# Patient Record
Sex: FEMALE | Race: WHITE | NOT HISPANIC OR LATINO | URBAN - METROPOLITAN AREA
[De-identification: names, ages, dates, MRNs, and addresses within clinical notes are randomized per-mention and may not be internally consistent; named-entity substitution may affect disease eponyms.]

---

## 2020-12-14 ENCOUNTER — OFFICE VISIT (OUTPATIENT)
Dept: URBAN - METROPOLITAN AREA CLINIC 84 | Facility: CLINIC | Age: 60
End: 2020-12-14
Payer: MEDICARE

## 2020-12-14 ENCOUNTER — WEB ENCOUNTER (OUTPATIENT)
Dept: URBAN - METROPOLITAN AREA CLINIC 84 | Facility: CLINIC | Age: 60
End: 2020-12-14

## 2020-12-14 VITALS
TEMPERATURE: 97.8 F | DIASTOLIC BLOOD PRESSURE: 90 MMHG | BODY MASS INDEX: 18.95 KG/M2 | WEIGHT: 103 LBS | HEIGHT: 62 IN | RESPIRATION RATE: 14 BRPM | SYSTOLIC BLOOD PRESSURE: 160 MMHG | HEART RATE: 82 BPM

## 2020-12-14 DIAGNOSIS — R63.3 FEEDING DIFFICULTIES: ICD-10-CM

## 2020-12-14 PROCEDURE — 99203 OFFICE O/P NEW LOW 30 MIN: CPT | Performed by: INTERNAL MEDICINE

## 2020-12-14 PROCEDURE — G8418 CALC BMI BLW LOW PARAM F/U: HCPCS | Performed by: INTERNAL MEDICINE

## 2020-12-14 PROCEDURE — 3017F COLORECTAL CA SCREEN DOC REV: CPT | Performed by: INTERNAL MEDICINE

## 2020-12-14 PROCEDURE — G8482 FLU IMMUNIZE ORDER/ADMIN: HCPCS | Performed by: INTERNAL MEDICINE

## 2020-12-14 PROCEDURE — 1036F TOBACCO NON-USER: CPT | Performed by: INTERNAL MEDICINE

## 2020-12-14 PROCEDURE — G8427 DOCREV CUR MEDS BY ELIG CLIN: HCPCS | Performed by: INTERNAL MEDICINE

## 2020-12-14 RX ORDER — SODIUM CHLORIDE TAB 1 GM 1 G
AS DIRECTED TAB MISCELLANEOUS
Status: ACTIVE | COMMUNITY

## 2020-12-14 RX ORDER — OLANZAPINE 2.5 MG/1
1 TABLET TABLET, FILM COATED ORAL ONCE A DAY
Status: ACTIVE | COMMUNITY

## 2020-12-14 RX ORDER — AMLODIPINE BESYLATE 5 MG/1
1 TABLET TABLET ORAL ONCE A DAY
Status: ACTIVE | COMMUNITY

## 2020-12-14 RX ORDER — LISINOPRIL 20 MG/1
1 TABLET TABLET ORAL ONCE A DAY
Status: ACTIVE | COMMUNITY

## 2020-12-14 NOTE — HPI-TODAY'S VISIT:
Patient present for possible PEG tube prior to possible restart of chemo. She is s/p chemo and xrt for throat cancer which required PEG tube and removed 6-7 months after patient showed signs of tolerating oral intake and weight gain. Recent imaging showed possible lung lesion of concern. Thus, pt sent for possible PEG anticipating chemo. Pt at this time will like to defer PEG unless sxs occur if and when chemo for new lesion is initiated. The decision is being discussed by the oncology team per pt's daughter.

## 2021-08-28 ENCOUNTER — TELEPHONE ENCOUNTER (OUTPATIENT)
Dept: URBAN - METROPOLITAN AREA CLINIC 13 | Facility: CLINIC | Age: 61
End: 2021-08-28

## 2021-08-29 ENCOUNTER — TELEPHONE ENCOUNTER (OUTPATIENT)
Dept: URBAN - METROPOLITAN AREA CLINIC 13 | Facility: CLINIC | Age: 61
End: 2021-08-29

## 2022-09-26 ENCOUNTER — OFFICE VISIT (OUTPATIENT)
Dept: URBAN - METROPOLITAN AREA CLINIC 88 | Facility: CLINIC | Age: 62
End: 2022-09-26
Payer: MEDICARE

## 2022-09-26 ENCOUNTER — LAB OUTSIDE AN ENCOUNTER (OUTPATIENT)
Dept: URBAN - METROPOLITAN AREA CLINIC 88 | Facility: CLINIC | Age: 62
End: 2022-09-26

## 2022-09-26 VITALS
TEMPERATURE: 97.3 F | HEART RATE: 81 BPM | SYSTOLIC BLOOD PRESSURE: 133 MMHG | DIASTOLIC BLOOD PRESSURE: 74 MMHG | HEIGHT: 62 IN | BODY MASS INDEX: 17.23 KG/M2 | WEIGHT: 93.6 LBS

## 2022-09-26 DIAGNOSIS — K59.04 CHRONIC IDIOPATHIC CONSTIPATION: ICD-10-CM

## 2022-09-26 DIAGNOSIS — K21.9 GASTROESOPHAGEAL REFLUX DISEASE, UNSPECIFIED WHETHER ESOPHAGITIS PRESENT: ICD-10-CM

## 2022-09-26 DIAGNOSIS — Z12.11 COLON CANCER SCREENING: ICD-10-CM

## 2022-09-26 PROCEDURE — 99214 OFFICE O/P EST MOD 30 MIN: CPT | Performed by: REGISTERED NURSE

## 2022-09-26 RX ORDER — LISINOPRIL 20 MG/1
1 TABLET TABLET ORAL ONCE A DAY
Status: DISCONTINUED | COMMUNITY

## 2022-09-26 RX ORDER — SUCRALFATE 1 G/1
1 TABLET ON AN EMPTY STOMACH TABLET ORAL
Qty: 90 | Refills: 1 | OUTPATIENT
Start: 2022-09-26 | End: 2022-11-24

## 2022-09-26 RX ORDER — ATENOLOL 25 MG/1
1 TABLET TABLET ORAL ONCE A DAY
Status: ACTIVE | COMMUNITY

## 2022-09-26 RX ORDER — SODIUM CHLORIDE TAB 1 GM 1 G
AS DIRECTED TAB MISCELLANEOUS
Status: ACTIVE | COMMUNITY

## 2022-09-26 RX ORDER — AMLODIPINE BESYLATE 5 MG/1
1 TABLET TABLET ORAL ONCE A DAY
Status: ACTIVE | COMMUNITY

## 2022-09-26 RX ORDER — LEVOTHYROXINE SODIUM 50 UG/1
1 TABLET IN THE MORNING ON AN EMPTY STOMACH TABLET ORAL ONCE A DAY
Status: ACTIVE | COMMUNITY

## 2022-09-26 RX ORDER — PANTOPRAZOLE SODIUM 40 MG/1
1 TABLET TABLET, DELAYED RELEASE ORAL TWICE A DAY
Status: ACTIVE | COMMUNITY

## 2022-09-26 RX ORDER — OLANZAPINE 2.5 MG/1
1 TABLET TABLET, FILM COATED ORAL ONCE A DAY
Status: DISCONTINUED | COMMUNITY

## 2022-09-26 NOTE — HPI-TODAY'S VISIT:
Pt presents with c/o GERD. Symptoms include severe heartburn and epigastric burning. Symptoms have been present for 2 months. Past treatment has been pantoprazole with no improvement. Symptoms are triggered by eating or drinking anything. Symptoms are relieved by nothing in particular. She has a hx of throat cancer. Had a PEG temporarily during treatment. She denies any dysphagia. She also c/o constipation. Bowels move 1-2 times a week. She frequently uses over the counter laxatives. No rectal bleeding. She has never had a colonoscopy.

## 2022-09-27 PROBLEM — 235595009 GASTROESOPHAGEAL REFLUX DISEASE: Status: ACTIVE | Noted: 2022-09-26

## 2022-10-14 ENCOUNTER — OFFICE VISIT (OUTPATIENT)
Dept: URBAN - METROPOLITAN AREA SURGERY CENTER 24 | Facility: SURGERY CENTER | Age: 62
End: 2022-10-14
Payer: MEDICARE

## 2022-10-14 ENCOUNTER — CLAIMS CREATED FROM THE CLAIM WINDOW (OUTPATIENT)
Dept: URBAN - METROPOLITAN AREA CLINIC 4 | Facility: CLINIC | Age: 62
End: 2022-10-14
Payer: MEDICARE

## 2022-10-14 DIAGNOSIS — K31.89 ACQUIRED DEFORMITY OF DUODENUM: ICD-10-CM

## 2022-10-14 DIAGNOSIS — K31.89 OTHER DISEASES OF STOMACH AND DUODENUM: ICD-10-CM

## 2022-10-14 DIAGNOSIS — K22.70 BARRETT ESOPHAGUS: ICD-10-CM

## 2022-10-14 PROCEDURE — G8907 PT DOC NO EVENTS ON DISCHARG: HCPCS | Performed by: INTERNAL MEDICINE

## 2022-10-14 PROCEDURE — 88312 SPECIAL STAINS GROUP 1: CPT | Performed by: PATHOLOGY

## 2022-10-14 PROCEDURE — 88305 TISSUE EXAM BY PATHOLOGIST: CPT | Performed by: PATHOLOGY

## 2022-10-14 PROCEDURE — 43239 EGD BIOPSY SINGLE/MULTIPLE: CPT | Performed by: INTERNAL MEDICINE

## 2022-10-14 RX ORDER — AMLODIPINE BESYLATE 5 MG/1
1 TABLET TABLET ORAL ONCE A DAY
Status: ACTIVE | COMMUNITY

## 2022-10-14 RX ORDER — SODIUM CHLORIDE TAB 1 GM 1 G
AS DIRECTED TAB MISCELLANEOUS
Status: ACTIVE | COMMUNITY

## 2022-10-14 RX ORDER — LEVOTHYROXINE SODIUM 50 UG/1
1 TABLET IN THE MORNING ON AN EMPTY STOMACH TABLET ORAL ONCE A DAY
Status: ACTIVE | COMMUNITY

## 2022-10-14 RX ORDER — ATENOLOL 25 MG/1
1 TABLET TABLET ORAL ONCE A DAY
Status: ACTIVE | COMMUNITY

## 2022-10-14 RX ORDER — SUCRALFATE 1 G/1
1 TABLET ON AN EMPTY STOMACH TABLET ORAL
Qty: 90 | Refills: 1 | Status: ACTIVE | COMMUNITY
Start: 2022-09-26 | End: 2022-11-24

## 2022-10-14 RX ORDER — PANTOPRAZOLE SODIUM 40 MG/1
1 TABLET TABLET, DELAYED RELEASE ORAL TWICE A DAY
Status: ACTIVE | COMMUNITY

## 2022-10-18 ENCOUNTER — ERX REFILL RESPONSE (OUTPATIENT)
Dept: URBAN - METROPOLITAN AREA CLINIC 70 | Facility: CLINIC | Age: 62
End: 2022-10-18

## 2022-10-18 RX ORDER — SUCRALFATE 1 G/1
1 TABLET ON AN EMPTY STOMACH TABLET ORAL
Qty: 90 | Refills: 1 | OUTPATIENT

## 2022-10-18 RX ORDER — SUCRALFATE 1 G/1
TAKE 1 TABLET ON AN EMPTY STOMACH ORALLY 3 TIMES A DAY BEFORE MEALS 30 DAY(S) TABLET ORAL
Qty: 90 TABLET | Refills: 1 | OUTPATIENT

## 2022-11-08 ENCOUNTER — OFFICE VISIT (OUTPATIENT)
Dept: URBAN - METROPOLITAN AREA CLINIC 88 | Facility: CLINIC | Age: 62
End: 2022-11-08
Payer: MEDICARE

## 2022-11-08 VITALS
SYSTOLIC BLOOD PRESSURE: 104 MMHG | TEMPERATURE: 97.7 F | HEART RATE: 60 BPM | HEIGHT: 62 IN | DIASTOLIC BLOOD PRESSURE: 68 MMHG | WEIGHT: 93.2 LBS | BODY MASS INDEX: 17.15 KG/M2

## 2022-11-08 DIAGNOSIS — K59.04 CHRONIC IDIOPATHIC CONSTIPATION: ICD-10-CM

## 2022-11-08 DIAGNOSIS — K21.00 GASTROESOPHAGEAL REFLUX DISEASE WITH ESOPHAGITIS WITHOUT HEMORRHAGE: ICD-10-CM

## 2022-11-08 DIAGNOSIS — K22.70 BARRETT'S ESOPHAGUS WITHOUT DYSPLASIA: ICD-10-CM

## 2022-11-08 DIAGNOSIS — Z12.11 COLON CANCER SCREENING: ICD-10-CM

## 2022-11-08 PROBLEM — 82934008 CHRONIC IDIOPATHIC CONSTIPATION: Status: ACTIVE | Noted: 2022-09-26

## 2022-11-08 PROCEDURE — 99214 OFFICE O/P EST MOD 30 MIN: CPT | Performed by: REGISTERED NURSE

## 2022-11-08 RX ORDER — AMLODIPINE BESYLATE 5 MG/1
1 TABLET TABLET ORAL ONCE A DAY
Status: ACTIVE | COMMUNITY

## 2022-11-08 RX ORDER — LEVOTHYROXINE SODIUM 50 UG/1
1 TABLET IN THE MORNING ON AN EMPTY STOMACH TABLET ORAL ONCE A DAY
Status: ACTIVE | COMMUNITY

## 2022-11-08 RX ORDER — ATENOLOL 25 MG/1
1 TABLET TABLET ORAL ONCE A DAY
Status: ACTIVE | COMMUNITY

## 2022-11-08 RX ORDER — PANTOPRAZOLE SODIUM 40 MG/1
1 TABLET TABLET, DELAYED RELEASE ORAL TWICE A DAY
Status: ON HOLD | COMMUNITY

## 2022-11-08 RX ORDER — SODIUM CHLORIDE TAB 1 GM 1 G
AS DIRECTED TAB MISCELLANEOUS
Status: DISCONTINUED | COMMUNITY

## 2022-11-08 RX ORDER — SUCRALFATE 1 G/1
TAKE 1 TABLET ON AN EMPTY STOMACH ORALLY 3 TIMES A DAY BEFORE MEALS 30 DAY(S) TABLET ORAL
OUTPATIENT

## 2022-11-08 RX ORDER — SUCRALFATE 1 G/1
TAKE 1 TABLET ON AN EMPTY STOMACH ORALLY 3 TIMES A DAY BEFORE MEALS 30 DAY(S) TABLET ORAL
Qty: 90 TABLET | Refills: 1 | Status: ACTIVE | COMMUNITY

## 2022-11-08 RX ORDER — PANTOPRAZOLE SODIUM 40 MG/1
1 TABLET TABLET, DELAYED RELEASE ORAL TWICE A DAY
OUTPATIENT

## 2022-11-08 NOTE — HPI-OTHER HISTORIES
Note from OV 9/26/22: Pt presents with c/o GERD. Symptoms include severe heartburn and epigastric burning. Symptoms have been present for 2 months. Past treatment has been pantoprazole with no improvement. Symptoms are triggered by eating or drinking anything. Symptoms are relieved by nothing in particular. She has a hx of throat cancer. Had a PEG temporarily during treatment. She denies any dysphagia. She also c/o constipation. Bowels move 1-2 times a week. She frequently uses over the counter laxatives. No rectal bleeding. She has never had a colonoscopy. ------------------------------------

## 2022-11-08 NOTE — HPI-TODAY'S VISIT:
GERD symptoms have improved some but she stopped taking the pantoprazole when she started the sucralfate. Bowels are moving better with Mirlax but she is not taking it every day.    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

## 2022-11-15 ENCOUNTER — ERX REFILL RESPONSE (OUTPATIENT)
Dept: URBAN - METROPOLITAN AREA CLINIC 70 | Facility: CLINIC | Age: 62
End: 2022-11-15

## 2022-11-15 RX ORDER — SUCRALFATE 1 G/1
TAKE 1 TABLET ON AN EMPTY STOMACH ORALLY 3 TIMES A DAY BEFORE MEALS 30 DAY(S) TABLET ORAL
Qty: 90 TABLET | Refills: 1 | OUTPATIENT

## 2022-11-15 RX ORDER — SUCRALFATE 1 G/1
TAKE 1 TABLET ON AN EMPTY STOMACH ORALLY 3 TIMES A DAY BEFORE MEALS 30 DAY(S) TABLET ORAL
OUTPATIENT

## 2022-12-09 ENCOUNTER — ERX REFILL RESPONSE (OUTPATIENT)
Dept: URBAN - METROPOLITAN AREA CLINIC 70 | Facility: CLINIC | Age: 62
End: 2022-12-09

## 2022-12-09 RX ORDER — SUCRALFATE 1 G/1
TAKE 1 TABLET ON AN EMPTY STOMACH ORALLY 3 TIMES A DAY BEFORE MEALS 30 DAY(S) TABLET ORAL
Qty: 90 TABLET | Refills: 1 | OUTPATIENT

## 2022-12-21 ENCOUNTER — TELEPHONE ENCOUNTER (OUTPATIENT)
Dept: URBAN - METROPOLITAN AREA CLINIC 88 | Facility: CLINIC | Age: 62
End: 2022-12-21

## 2022-12-21 RX ORDER — PANTOPRAZOLE SODIUM 40 MG/1
1 TABLET TABLET, DELAYED RELEASE ORAL ONCE A DAY
Qty: 30 | Refills: 6

## 2023-01-04 ENCOUNTER — ERX REFILL RESPONSE (OUTPATIENT)
Dept: URBAN - METROPOLITAN AREA CLINIC 70 | Facility: CLINIC | Age: 63
End: 2023-01-04

## 2023-01-04 RX ORDER — SUCRALFATE 1 G/1
TAKE 1 TABLET ON AN EMPTY STOMACH ORALLY 3 TIMES A DAY BEFORE MEALS 30 DAY(S) TABLET ORAL
Qty: 90 TABLET | Refills: 1 | OUTPATIENT

## 2023-01-11 ENCOUNTER — OFFICE VISIT (OUTPATIENT)
Dept: URBAN - METROPOLITAN AREA CLINIC 88 | Facility: CLINIC | Age: 63
End: 2023-01-11
Payer: MEDICARE

## 2023-01-11 ENCOUNTER — LAB OUTSIDE AN ENCOUNTER (OUTPATIENT)
Dept: URBAN - METROPOLITAN AREA CLINIC 88 | Facility: CLINIC | Age: 63
End: 2023-01-11

## 2023-01-11 VITALS
HEART RATE: 65 BPM | HEIGHT: 62 IN | DIASTOLIC BLOOD PRESSURE: 83 MMHG | BODY MASS INDEX: 17.63 KG/M2 | WEIGHT: 95.8 LBS | TEMPERATURE: 97 F | SYSTOLIC BLOOD PRESSURE: 118 MMHG

## 2023-01-11 DIAGNOSIS — Z12.11 ENCOUNTER FOR SCREENING COLONOSCOPY: ICD-10-CM

## 2023-01-11 DIAGNOSIS — K22.70 BARRETT'S ESOPHAGUS WITHOUT DYSPLASIA: ICD-10-CM

## 2023-01-11 DIAGNOSIS — K59.09 CHANGE IN BOWEL MOVEMENTS INTERMITTENT CONSTIPATION. URGENCY IN THE MORNING.: ICD-10-CM

## 2023-01-11 DIAGNOSIS — R10.13 EPIGASTRIC ABDOMINAL PAIN: ICD-10-CM

## 2023-01-11 PROCEDURE — 99214 OFFICE O/P EST MOD 30 MIN: CPT | Performed by: NURSE PRACTITIONER

## 2023-01-11 RX ORDER — AMLODIPINE BESYLATE 5 MG/1
1 TABLET TABLET ORAL ONCE A DAY
Status: ACTIVE | COMMUNITY

## 2023-01-11 RX ORDER — ATENOLOL 25 MG/1
1 TABLET TABLET ORAL ONCE A DAY
Status: ACTIVE | COMMUNITY

## 2023-01-11 RX ORDER — PANTOPRAZOLE SODIUM 40 MG/1
1 TABLET TABLET, DELAYED RELEASE ORAL
Qty: 180 | Refills: 1

## 2023-01-11 RX ORDER — LEVOTHYROXINE SODIUM 50 UG/1
1 TABLET IN THE MORNING ON AN EMPTY STOMACH TABLET ORAL ONCE A DAY
Status: ACTIVE | COMMUNITY

## 2023-01-11 RX ORDER — PANTOPRAZOLE SODIUM 40 MG/1
1 TABLET TABLET, DELAYED RELEASE ORAL ONCE A DAY
Qty: 30 | Refills: 6 | Status: ACTIVE | COMMUNITY

## 2023-01-11 RX ORDER — SUCRALFATE 1 G/1
TAKE 1 TABLET ON AN EMPTY STOMACH ORALLY 3 TIMES A DAY BEFORE MEALS 30 DAY(S) TABLET ORAL
OUTPATIENT

## 2023-01-11 RX ORDER — SUCRALFATE 1 G/1
TAKE 1 TABLET ON AN EMPTY STOMACH ORALLY 3 TIMES A DAY BEFORE MEALS 30 DAY(S) TABLET ORAL
Qty: 90 TABLET | Refills: 1 | Status: ACTIVE | COMMUNITY

## 2023-01-11 NOTE — HPI-TODAY'S VISIT:
Patient presents today, along with daughter, for c/o persistent burning epigastric pain.  Was instructed to increase pantoprazole to 40 mg BID after [facility]OV.  Despite this change patient continued to experience this pain.  Pain is worse after eating and drinking in general.  Discomfort may radiate up into lower sternum at times.  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  Defecation does help to improve discomfort slightly.  Voices long hx of constipation that has increased over the years.  Without use of laxatives, defecation occurs every 4-5 days.  Currently takes OTC laxatives every other day to induce defecation.  With laxative use, defecation occurs every 2-3 days.  Still fails to achieve a complete sense of evacuation, which leads to increase bloating, flatulence and abdominal pain.  Feels her abdominal burning does improve slightly with defecation.  Has tried and failed stool softeners, magnesium tablets, fiber regimen and Miralax in past for constipation.  Denies prior colonoscopy.

## 2023-01-11 NOTE — PHYSICAL EXAM GASTROINTESTINAL
Abdomen , soft, RUQ and epigastric tenderness with involuntary guarding, nondistended , no rigidity , no masses palpable , normal bowel sounds , Liver and Spleen:  no hepatosplenomegaly

## 2023-01-19 ENCOUNTER — WEB ENCOUNTER (OUTPATIENT)
Dept: URBAN - METROPOLITAN AREA CLINIC 88 | Facility: CLINIC | Age: 63
End: 2023-01-19

## 2023-01-23 ENCOUNTER — LAB OUTSIDE AN ENCOUNTER (OUTPATIENT)
Dept: URBAN - METROPOLITAN AREA CLINIC 88 | Facility: CLINIC | Age: 63
End: 2023-01-23

## 2023-01-25 ENCOUNTER — TELEPHONE ENCOUNTER (OUTPATIENT)
Dept: URBAN - METROPOLITAN AREA CLINIC 88 | Facility: CLINIC | Age: 63
End: 2023-01-25

## 2023-01-25 RX ORDER — POLYETHYLENE GLYCOL 3350, SODIUM CHLORIDE, SODIUM BICARBONATE, POTASSIUM CHLORIDE 420; 11.2; 5.72; 1.48 G/4L; G/4L; G/4L; G/4L
MIX AS DIRECTED.  DRINK ONE CUP EVERY 15 MINUTES UNTIL COMPLETE FOR BOWEL CLEANSING POWDER, FOR SOLUTION ORAL ONCE
Qty: 1 | Refills: 0 | OUTPATIENT
Start: 2023-01-31 | End: 2023-02-01

## 2023-01-25 RX ORDER — LACTULOSE 10 G/15ML
TAKE 15 ML SOLUTION ORAL
Qty: 900 ML | Refills: 5
Start: 2023-01-30 | End: 2023-07-30

## 2023-01-30 ENCOUNTER — TELEPHONE ENCOUNTER (OUTPATIENT)
Dept: URBAN - METROPOLITAN AREA CLINIC 70 | Facility: CLINIC | Age: 63
End: 2023-01-30

## 2023-01-30 RX ORDER — LACTULOSE 10 G/15ML
TAKE 15 ML SOLUTION ORAL
Qty: 900 ML | Refills: 5 | OUTPATIENT
Start: 2023-01-30 | End: 2023-07-29

## 2023-02-02 ENCOUNTER — ERX REFILL RESPONSE (OUTPATIENT)
Dept: URBAN - METROPOLITAN AREA CLINIC 70 | Facility: CLINIC | Age: 63
End: 2023-02-02

## 2023-02-02 RX ORDER — SUCRALFATE 1 G/1
TAKE 1 TABLET ON AN EMPTY STOMACH ORALLY 3 TIMES A DAY BEFORE MEALS 30 DAY(S) TABLET ORAL
Qty: 90 TABLET | Refills: 1 | OUTPATIENT

## 2023-02-02 RX ORDER — SUCRALFATE 1 G/1
TAKE 1 TABLET ON AN EMPTY STOMACH ORALLY 3 TIMES A DAY BEFORE MEALS 30 DAY(S) TABLET ORAL
Qty: 90 TABLET | Refills: 2 | OUTPATIENT

## 2023-02-14 PROBLEM — 35298007: Status: ACTIVE | Noted: 2023-01-11

## 2023-02-15 ENCOUNTER — OFFICE VISIT (OUTPATIENT)
Dept: URBAN - METROPOLITAN AREA CLINIC 88 | Facility: CLINIC | Age: 63
End: 2023-02-15
Payer: MEDICARE

## 2023-02-15 VITALS
HEIGHT: 62 IN | HEART RATE: 64 BPM | WEIGHT: 94.2 LBS | SYSTOLIC BLOOD PRESSURE: 123 MMHG | TEMPERATURE: 97.9 F | DIASTOLIC BLOOD PRESSURE: 72 MMHG | BODY MASS INDEX: 17.34 KG/M2

## 2023-02-15 DIAGNOSIS — R10.13 EPIGASTRIC ABDOMINAL PAIN: ICD-10-CM

## 2023-02-15 DIAGNOSIS — Z12.11 ENCOUNTER FOR SCREENING COLONOSCOPY: ICD-10-CM

## 2023-02-15 DIAGNOSIS — K22.70 BARRETT'S ESOPHAGUS WITHOUT DYSPLASIA: ICD-10-CM

## 2023-02-15 DIAGNOSIS — K58.1 IRRITABLE BOWEL SYNDROME WITH CONSTIPATION: ICD-10-CM

## 2023-02-15 PROBLEM — 440630006: Status: ACTIVE | Noted: 2023-01-11

## 2023-02-15 PROBLEM — 302914006 BARRETT'S ESOPHAGUS: Status: ACTIVE | Noted: 2022-11-08

## 2023-02-15 PROCEDURE — 99214 OFFICE O/P EST MOD 30 MIN: CPT | Performed by: NURSE PRACTITIONER

## 2023-02-15 RX ORDER — ATENOLOL 25 MG/1
1 TABLET TABLET ORAL ONCE A DAY
Status: ACTIVE | COMMUNITY

## 2023-02-15 RX ORDER — LACTULOSE 10 G/15ML
TAKE 15 ML SOLUTION ORAL
Qty: 900 ML | Refills: 5 | Status: ACTIVE | COMMUNITY
Start: 2023-01-30 | End: 2023-07-29

## 2023-02-15 RX ORDER — LACTULOSE 10 G/15ML
TAKE 15 ML SOLUTION ORAL
OUTPATIENT
Start: 2023-01-30

## 2023-02-15 RX ORDER — PANTOPRAZOLE SODIUM 40 MG/1
1 TABLET TABLET, DELAYED RELEASE ORAL
Qty: 180 | Refills: 1 | Status: ACTIVE | COMMUNITY

## 2023-02-15 RX ORDER — SUCRALFATE 1 G/1
TAKE 1 TABLET ON AN EMPTY STOMACH ORALLY 3 TIMES A DAY BEFORE MEALS 30 DAY(S) TABLET ORAL
Qty: 90 TABLET | Refills: 1 | Status: ACTIVE | COMMUNITY

## 2023-02-15 RX ORDER — LEVOTHYROXINE SODIUM 50 UG/1
1 TABLET IN THE MORNING ON AN EMPTY STOMACH TABLET ORAL ONCE A DAY
Status: ACTIVE | COMMUNITY

## 2023-02-15 RX ORDER — AMLODIPINE BESYLATE 5 MG/1
1 TABLET TABLET ORAL ONCE A DAY
Status: ACTIVE | COMMUNITY

## 2023-02-15 RX ORDER — PANTOPRAZOLE SODIUM 40 MG/1
1 TABLET TABLET, DELAYED RELEASE ORAL
OUTPATIENT

## 2023-02-15 NOTE — HPI-TODAY'S VISIT:
Patient presents today for f/u regarding constipation and GERD.  KUB 01/24/2023:  Moderate volume of stool. RUQ US 01/17/2023:  Fatty liver, small gallstone w/o signs of cholecystitis.  Provided with Colyte bowel cleanse after KUB resulted.  Patient states her epigastric pain and bloating improved after this.  Started daily use of lactulose which leads to daily BMs.  Medication may cause gas/bloating but overall feels medication effective.  Has noticed less symptoms of reflux.  Did not feel samples of Linzes 290 mcg were as effective as Lactulose.   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  Denies prior colonoscopy.  Overall, voices no complaints at this time.

## 2023-02-15 NOTE — HPI-OTHER HISTORIES
---------------------------------------------------------------------------- Last office note 01/11/2023: Patient presents today, along with daughter, for c/o persistent burning epigastric pain.  Was instructed to increase pantoprazole to 40 mg BID after LOV.  Despite this change patient continued to experience this pain.  Pain is worse after eating and drinking in general.  Discomfort may radiate up into lower sternum at times.  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  Defecation does help to improve discomfort slightly.  Voices long hx of constipation that has increased over the years.  Without use of laxatives, defecation occurs every 4-5 days.  Currently takes OTC laxatives every other day to induce defecation.  With laxative use, defecation occurs every 2-3 days.  Still fails to achieve a complete sense of evacuation, which leads to increase bloating, flatulence and abdominal pain.  Feels her abdominal burning does improve slightly with defecation.  Has tried and failed stool softeners, magnesium tablets, fiber regimen and Miralax in past for constipation.  Denies prior colonoscopy.

## 2023-03-02 ENCOUNTER — ERX REFILL RESPONSE (OUTPATIENT)
Dept: URBAN - METROPOLITAN AREA CLINIC 70 | Facility: CLINIC | Age: 63
End: 2023-03-02

## 2023-03-02 RX ORDER — SUCRALFATE 1 G/1
TAKE 1 TABLET ON AN EMPTY STOMACH ORALLY 3 TIMES A DAY BEFORE MEALS 30 DAY(S) TABLET ORAL
Qty: 90 TABLET | Refills: 1 | OUTPATIENT

## 2023-03-17 ENCOUNTER — ERX REFILL RESPONSE (OUTPATIENT)
Dept: URBAN - METROPOLITAN AREA CLINIC 70 | Facility: CLINIC | Age: 63
End: 2023-03-17

## 2023-03-17 RX ORDER — SUCRALFATE 1 G/1
TAKE 1 TABLET ON AN EMPTY STOMACH ORALLY 3 TIMES A DAY BEFORE MEALS 30 DAY(S) TABLET ORAL
Qty: 90 TABLET | Refills: 1 | OUTPATIENT

## 2023-03-17 RX ORDER — SUCRALFATE 1 G/1
TAKE 1 TABLET ON AN EMPTY STOMACH ORALLY 3 TIMES A DAY BEFORE MEALS 30 DAY(S) TABLET ORAL
Qty: 270 TABLET | Refills: 1 | OUTPATIENT

## 2023-05-08 ENCOUNTER — TELEPHONE ENCOUNTER (OUTPATIENT)
Dept: URBAN - METROPOLITAN AREA CLINIC 88 | Facility: CLINIC | Age: 63
End: 2023-05-08

## 2023-05-08 RX ORDER — PANTOPRAZOLE SODIUM 40 MG/1
1 TABLET TABLET, DELAYED RELEASE ORAL
Qty: 180 | Refills: 1

## 2023-05-30 ENCOUNTER — LAB OUTSIDE AN ENCOUNTER (OUTPATIENT)
Dept: URBAN - METROPOLITAN AREA CLINIC 88 | Facility: CLINIC | Age: 63
End: 2023-05-30

## 2023-05-30 ENCOUNTER — OFFICE VISIT (OUTPATIENT)
Dept: URBAN - METROPOLITAN AREA CLINIC 88 | Facility: CLINIC | Age: 63
End: 2023-05-30
Payer: MEDICARE

## 2023-05-30 VITALS
WEIGHT: 93.4 LBS | TEMPERATURE: 98.1 F | HEART RATE: 79 BPM | BODY MASS INDEX: 17.19 KG/M2 | HEIGHT: 62 IN | DIASTOLIC BLOOD PRESSURE: 79 MMHG | SYSTOLIC BLOOD PRESSURE: 130 MMHG

## 2023-05-30 DIAGNOSIS — Z12.11 ENCOUNTER FOR SCREENING COLONOSCOPY: ICD-10-CM

## 2023-05-30 DIAGNOSIS — R07.89 OTHER CHEST PAIN: ICD-10-CM

## 2023-05-30 DIAGNOSIS — K22.70 BARRETT'S ESOPHAGUS WITHOUT DYSPLASIA: ICD-10-CM

## 2023-05-30 DIAGNOSIS — K58.1 IRRITABLE BOWEL SYNDROME WITH CONSTIPATION: ICD-10-CM

## 2023-05-30 DIAGNOSIS — R10.13 EPIGASTRIC ABDOMINAL PAIN: ICD-10-CM

## 2023-05-30 PROCEDURE — 99214 OFFICE O/P EST MOD 30 MIN: CPT | Performed by: NURSE PRACTITIONER

## 2023-05-30 RX ORDER — AMLODIPINE BESYLATE 5 MG/1
1 TABLET TABLET ORAL ONCE A DAY
Status: ACTIVE | COMMUNITY

## 2023-05-30 RX ORDER — LACTULOSE 10 G/15ML
TAKE 15 ML SOLUTION ORAL
OUTPATIENT

## 2023-05-30 RX ORDER — LEVOTHYROXINE SODIUM 50 UG/1
1 TABLET IN THE MORNING ON AN EMPTY STOMACH TABLET ORAL ONCE A DAY
Status: ACTIVE | COMMUNITY

## 2023-05-30 RX ORDER — SUCRALFATE 1 G/1
TAKE 1 TABLET ON AN EMPTY STOMACH ORALLY 3 TIMES A DAY BEFORE MEALS 30 DAY(S) TABLET ORAL
Qty: 270 TABLET | Refills: 1 | Status: ACTIVE | COMMUNITY

## 2023-05-30 RX ORDER — LACTULOSE 10 G/15ML
TAKE 15 ML SOLUTION ORAL
Status: ACTIVE | COMMUNITY
Start: 2023-01-30

## 2023-05-30 RX ORDER — PANTOPRAZOLE SODIUM 40 MG/1
1 TABLET TABLET, DELAYED RELEASE ORAL
Qty: 180 | Refills: 1 | Status: ACTIVE | COMMUNITY

## 2023-05-30 RX ORDER — ATENOLOL 25 MG/1
1 TABLET TABLET ORAL ONCE A DAY
Status: ACTIVE | COMMUNITY

## 2023-05-30 RX ORDER — PANTOPRAZOLE SODIUM 40 MG/1
1 TABLET TABLET, DELAYED RELEASE ORAL
OUTPATIENT

## 2023-05-30 NOTE — HPI-OTHER HISTORIES
------------------------------------------------------------------------------------- Last office note 02/15/2023: Patient presents today for f/u regarding constipation and GERD.  KUB 01/24/2023:  Moderate volume of stool. RUQ US 01/17/2023:  Fatty liver, small gallstone w/o signs of cholecystitis.  Provided with Colyte bowel cleanse after KUB resulted.  Patient states her epigastric pain and bloating improved after this.  Started daily use of lactulose which leads to daily BMs.  Medication may cause gas/bloating but overall feels medication effective.  Has noticed less symptoms of reflux.  Did not feel samples of Linzes 290 mcg were as effective as Lactulose.   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  Denies prior colonoscopy.  Overall, voices no complaints at this time.

## 2023-05-30 NOTE — PHYSICAL EXAM GASTROINTESTINAL
Abdomen , soft, RUQ and epigastric tenderness with involuntary guarding, nondistended , no guarding or rigidity , no masses palpable , normal bowel sounds , Liver and Spleen: no hepatosplenomegaly

## 2023-06-02 ENCOUNTER — OFFICE VISIT (OUTPATIENT)
Dept: URBAN - METROPOLITAN AREA SURGERY CENTER 24 | Facility: SURGERY CENTER | Age: 63
End: 2023-06-02

## 2023-06-05 ENCOUNTER — OFFICE VISIT (OUTPATIENT)
Dept: URBAN - METROPOLITAN AREA SURGERY CENTER 24 | Facility: SURGERY CENTER | Age: 63
End: 2023-06-05

## 2023-06-13 NOTE — PHYSICAL EXAM CONSTITUTIONAL:
well developed, well nourished , in no acute distress , ambulating without difficulty , normal communication ability Post-Care Instructions: I reviewed with the patient in detail post-care instructions. Patient is to keep the biopsy site dry overnight, and then apply vaseline and a bandage  daily until healed.

## 2023-06-14 ENCOUNTER — CLAIMS CREATED FROM THE CLAIM WINDOW (OUTPATIENT)
Dept: URBAN - METROPOLITAN AREA CLINIC 4 | Facility: CLINIC | Age: 63
End: 2023-06-14
Payer: MEDICARE

## 2023-06-14 ENCOUNTER — OFFICE VISIT (OUTPATIENT)
Dept: URBAN - METROPOLITAN AREA SURGERY CENTER 24 | Facility: SURGERY CENTER | Age: 63
End: 2023-06-14
Payer: MEDICARE

## 2023-06-14 DIAGNOSIS — Z87.19 ESOPHAGEAL FOOD BOLUS: ICD-10-CM

## 2023-06-14 DIAGNOSIS — K21.9 ACID REFLUX: ICD-10-CM

## 2023-06-14 DIAGNOSIS — K29.70 GASTRITIS, UNSPECIFIED, WITHOUT BLEEDING: ICD-10-CM

## 2023-06-14 DIAGNOSIS — K29.60 ADENOPAPILLOMATOSIS GASTRICA: ICD-10-CM

## 2023-06-14 DIAGNOSIS — K31.89 ACQUIRED DEFORMITY OF DUODENUM: ICD-10-CM

## 2023-06-14 DIAGNOSIS — K21.9 GASTRO-ESOPHAGEAL REFLUX DISEASE WITHOUT ESOPHAGITIS: ICD-10-CM

## 2023-06-14 PROCEDURE — G8907 PT DOC NO EVENTS ON DISCHARG: HCPCS | Performed by: INTERNAL MEDICINE

## 2023-06-14 PROCEDURE — 88305 TISSUE EXAM BY PATHOLOGIST: CPT | Performed by: PATHOLOGY

## 2023-06-14 PROCEDURE — 88312 SPECIAL STAINS GROUP 1: CPT | Performed by: PATHOLOGY

## 2023-06-14 PROCEDURE — 43239 EGD BIOPSY SINGLE/MULTIPLE: CPT | Performed by: INTERNAL MEDICINE

## 2023-06-14 PROCEDURE — 88313 SPECIAL STAINS GROUP 2: CPT | Performed by: PATHOLOGY

## 2023-06-14 RX ORDER — LACTULOSE 10 G/15ML
TAKE 15 ML SOLUTION ORAL
Status: ACTIVE | COMMUNITY

## 2023-06-14 RX ORDER — SUCRALFATE 1 G/1
TAKE 1 TABLET ON AN EMPTY STOMACH ORALLY 3 TIMES A DAY BEFORE MEALS 30 DAY(S) TABLET ORAL
Qty: 270 TABLET | Refills: 1 | Status: ACTIVE | COMMUNITY

## 2023-06-14 RX ORDER — PANTOPRAZOLE SODIUM 40 MG/1
1 TABLET TABLET, DELAYED RELEASE ORAL
Status: ACTIVE | COMMUNITY

## 2023-06-14 RX ORDER — LEVOTHYROXINE SODIUM 50 UG/1
1 TABLET IN THE MORNING ON AN EMPTY STOMACH TABLET ORAL ONCE A DAY
Status: ACTIVE | COMMUNITY

## 2023-06-14 RX ORDER — AMLODIPINE BESYLATE 5 MG/1
1 TABLET TABLET ORAL ONCE A DAY
Status: ACTIVE | COMMUNITY

## 2023-06-14 RX ORDER — ATENOLOL 25 MG/1
1 TABLET TABLET ORAL ONCE A DAY
Status: ACTIVE | COMMUNITY

## 2023-06-20 ENCOUNTER — TELEPHONE ENCOUNTER (OUTPATIENT)
Dept: URBAN - METROPOLITAN AREA CLINIC 88 | Facility: CLINIC | Age: 63
End: 2023-06-20

## 2023-06-20 RX ORDER — SUCRALFATE 1 G/1
TAKE 1 TABLET ON AN EMPTY STOMACH ORALLY 3 TIMES A DAY BEFORE MEALS 30 DAY(S) TABLET ORAL
Qty: 90 | Refills: 1

## 2023-06-21 ENCOUNTER — ERX REFILL RESPONSE (OUTPATIENT)
Dept: URBAN - METROPOLITAN AREA CLINIC 70 | Facility: CLINIC | Age: 63
End: 2023-06-21

## 2023-06-21 RX ORDER — LACTULOSE 10 G/15ML
TAKE 15 ML SOLUTION ORAL
Qty: 900 ML | Refills: 5 | OUTPATIENT

## 2023-06-21 RX ORDER — LACTULOSE 10 G/15ML
TAKE 15 ML SOLUTION ORAL
OUTPATIENT

## 2023-07-19 ENCOUNTER — LAB OUTSIDE AN ENCOUNTER (OUTPATIENT)
Dept: URBAN - METROPOLITAN AREA CLINIC 88 | Facility: CLINIC | Age: 63
End: 2023-07-19

## 2023-07-19 ENCOUNTER — OFFICE VISIT (OUTPATIENT)
Dept: URBAN - METROPOLITAN AREA CLINIC 88 | Facility: CLINIC | Age: 63
End: 2023-07-19
Payer: MEDICARE

## 2023-07-19 VITALS
HEART RATE: 90 BPM | DIASTOLIC BLOOD PRESSURE: 69 MMHG | SYSTOLIC BLOOD PRESSURE: 109 MMHG | TEMPERATURE: 97.8 F | HEIGHT: 62 IN | WEIGHT: 93.6 LBS | BODY MASS INDEX: 17.23 KG/M2

## 2023-07-19 DIAGNOSIS — K21.9 ACID REFLUX: ICD-10-CM

## 2023-07-19 DIAGNOSIS — K58.1 IRRITABLE BOWEL SYNDROME WITH CONSTIPATION: ICD-10-CM

## 2023-07-19 DIAGNOSIS — K80.20 CALCULUS OF GALLBLADDER WITHOUT CHOLECYSTITIS WITHOUT OBSTRUCTION: ICD-10-CM

## 2023-07-19 DIAGNOSIS — Z12.11 ENCOUNTER FOR SCREENING COLONOSCOPY: ICD-10-CM

## 2023-07-19 PROBLEM — 70342003: Status: ACTIVE | Noted: 2023-07-19

## 2023-07-19 PROBLEM — 193462001: Status: ACTIVE | Noted: 2023-07-19

## 2023-07-19 PROCEDURE — 99214 OFFICE O/P EST MOD 30 MIN: CPT | Performed by: NURSE PRACTITIONER

## 2023-07-19 RX ORDER — LACTULOSE 10 G/15ML
TAKE 15 ML SOLUTION ORAL
Qty: 900 ML | Refills: 5 | Status: ACTIVE | COMMUNITY

## 2023-07-19 RX ORDER — AMLODIPINE BESYLATE 5 MG/1
1 TABLET TABLET ORAL ONCE A DAY
Status: ACTIVE | COMMUNITY

## 2023-07-19 RX ORDER — PANTOPRAZOLE SODIUM 40 MG/1
1 TABLET TABLET, DELAYED RELEASE ORAL
OUTPATIENT

## 2023-07-19 RX ORDER — TRAZODONE HYDROCHLORIDE 50 MG/1
TAKE 1/2 TABLET AT BEDTIME AS NEEDED TABLET ORAL
Qty: 15 | Refills: 1 | OUTPATIENT
Start: 2023-07-19

## 2023-07-19 RX ORDER — LEVOTHYROXINE SODIUM 50 UG/1
1 TABLET IN THE MORNING ON AN EMPTY STOMACH TABLET ORAL ONCE A DAY
Status: ACTIVE | COMMUNITY

## 2023-07-19 RX ORDER — ATENOLOL 25 MG/1
1 TABLET TABLET ORAL ONCE A DAY
Status: ACTIVE | COMMUNITY

## 2023-07-19 RX ORDER — OMEPRAZOLE 40 MG/1
TAKE 1 CAPSULE CAPSULE, DELAYED RELEASE ORAL
Qty: 180 | Refills: 1 | OUTPATIENT
Start: 2023-07-19

## 2023-07-19 RX ORDER — SUCRALFATE 1 G/1
TAKE 1 TABLET ON AN EMPTY STOMACH ORALLY 3 TIMES A DAY BEFORE MEALS 30 DAY(S) TABLET ORAL
Qty: 90 | Refills: 1 | Status: ACTIVE | COMMUNITY

## 2023-07-19 RX ORDER — LACTULOSE 10 G/15ML
TAKE 15 ML SOLUTION ORAL
OUTPATIENT

## 2023-07-19 RX ORDER — PANTOPRAZOLE SODIUM 40 MG/1
1 TABLET TABLET, DELAYED RELEASE ORAL
Status: ACTIVE | COMMUNITY

## 2023-07-19 NOTE — HPI-TODAY'S VISIT:
Presents as procedure follow up after undergoing EGD on 2023.  EGD repeated due to continued c/o mid-chest pain and reflux.  Findings:  reflux esophagitis (neg Montoya's), non-bleeding erosive gastritis (neg HP) and normal duodenum.   Remains on pantoprazole 40 mg BID and Carafate TID but continues to voice burning epigastric abdominal pain.  Patient describes her appetite as being good and desires to eat.  However, eating leads to increase epigastric pain.  Denies nausea, vomiting or symptoms of chest pain are voiced at this time.  Has not been evaluated by cardiology as recommended at LOV.  Describes her anxiety/stress level as being manageable.  However, she states she only sleeps about 2-4 hours.  Taking melatonin does not provide long-lasting relief.    RUQ US 2023: suspected gallstone w/o signs of cholecystitis.  HIDA scan was ordered at LOV but has not been completed as of this visit.   Patient is requesting antireflux medication.    Remains on lactulose daily and voices a complete sense of evacuation with daily use.  Of note, patient has hx of throat cancer, grandson has hx of leukemia and sister  earlier this year from cancer.

## 2023-07-19 NOTE — HPI-OTHER HISTORIES
------------------------------------------------------------------------------- Last office note 05/30/2023: Presents today with continued c/o burning mid-chest pain that occurs on daily basis, especially post-prandially.  Feels burning sensation is worse after eating spicy, seasoned and acidic foods.  As result, has been "living off of" toast, crackers and milk.  Pain has persisted despite taking high dose PPI therapy for several months.  Addition of carafate provided little relief in complaints.  As result, has started taking Mylanta or Maalox as needed.  Denies signs of GI blood loss or weight loss.  Pain does not radiate to other regions.  Feels pain has remained at same intensity since initial consult.  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  RUQ US 01/17/2023:  Normal. Remains on lactulose once a day with adequate BMs voiced.  Experiences a complete sense of evacuation with every BM.   Denies recent cardiac evaluation.   Daughter states patient to receive results of PET scan from oncologist tomorrow.

## 2023-07-20 ENCOUNTER — ERX REFILL RESPONSE (OUTPATIENT)
Dept: URBAN - METROPOLITAN AREA CLINIC 70 | Facility: CLINIC | Age: 63
End: 2023-07-20

## 2023-07-20 RX ORDER — SUCRALFATE 1 G/1
TAKE 1 TABLET ON AN EMPTY STOMACH ORALLY 3 TIMES A DAY BEFORE MEALS 30 DAY(S) TABLET ORAL
Qty: 90 | Refills: 1 | OUTPATIENT

## 2023-07-20 RX ORDER — SUCRALFATE 1 G/1
TAKE 1 TABLET ON AN EMPTY STOMACH ORALLY 3 TIMES A DAY BEFORE MEALS 30 DAYS TABLET ORAL
Qty: 90 TABLET | Refills: 1 | OUTPATIENT

## 2023-08-23 ENCOUNTER — OFFICE VISIT (OUTPATIENT)
Dept: URBAN - METROPOLITAN AREA CLINIC 88 | Facility: CLINIC | Age: 63
End: 2023-08-23

## 2023-08-24 ENCOUNTER — ERX REFILL RESPONSE (OUTPATIENT)
Dept: URBAN - METROPOLITAN AREA CLINIC 70 | Facility: CLINIC | Age: 63
End: 2023-08-24

## 2023-08-24 RX ORDER — TRAZODONE HYDROCHLORIDE 50 MG/1
TAKE 1/2 TABLET AT BEDTIME AS NEEDED TABLET ORAL
Qty: 15 | Refills: 1 | OUTPATIENT

## 2023-08-24 RX ORDER — TRAZODONE HYDROCHLORIDE 50 MG/1
TAKE 1/2 TABLET AT BEDTIME AS NEEDED ORALLY ONCE A DAY FOR SLEEP 30 DAYS TABLET ORAL
Qty: 15 TABLET | Refills: 2 | OUTPATIENT

## 2023-09-01 ENCOUNTER — ERX REFILL RESPONSE (OUTPATIENT)
Dept: URBAN - METROPOLITAN AREA CLINIC 70 | Facility: CLINIC | Age: 63
End: 2023-09-01

## 2023-09-01 RX ORDER — SUCRALFATE 1 G/1
TAKE 1 TABLET ON AN EMPTY STOMACH ORALLY 3 TIMES A DAY BEFORE MEALS 30 DAYS TABLET ORAL
Qty: 90 TABLET | Refills: 1 | OUTPATIENT

## 2023-09-01 RX ORDER — SUCRALFATE 1 G/1
TAKE 1 TABLET ON AN EMPTY STOMACH ORALLY 3 TIMES A DAY BEFORE MEALS 30 DAYS TABLET ORAL
Qty: 90 TABLET | Refills: 2 | OUTPATIENT

## 2023-09-13 ENCOUNTER — LAB OUTSIDE AN ENCOUNTER (OUTPATIENT)
Dept: URBAN - METROPOLITAN AREA CLINIC 88 | Facility: CLINIC | Age: 63
End: 2023-09-13

## 2023-09-13 ENCOUNTER — OFFICE VISIT (OUTPATIENT)
Dept: URBAN - METROPOLITAN AREA CLINIC 88 | Facility: CLINIC | Age: 63
End: 2023-09-13
Payer: MEDICARE

## 2023-09-13 VITALS
HEIGHT: 63 IN | DIASTOLIC BLOOD PRESSURE: 80 MMHG | HEART RATE: 84 BPM | BODY MASS INDEX: 16.55 KG/M2 | SYSTOLIC BLOOD PRESSURE: 149 MMHG | TEMPERATURE: 97.4 F | OXYGEN SATURATION: 98 % | WEIGHT: 93.4 LBS

## 2023-09-13 DIAGNOSIS — R07.89 OTHER CHEST PAIN: ICD-10-CM

## 2023-09-13 DIAGNOSIS — R10.13 EPIGASTRIC ABDOMINAL PAIN: ICD-10-CM

## 2023-09-13 DIAGNOSIS — K80.20 CALCULUS OF GALLBLADDER WITHOUT CHOLECYSTITIS WITHOUT OBSTRUCTION: ICD-10-CM

## 2023-09-13 DIAGNOSIS — K21.00 GASTROESOPHAGEAL REFLUX DISEASE WITH ESOPHAGITIS WITHOUT HEMORRHAGE: ICD-10-CM

## 2023-09-13 DIAGNOSIS — Z12.11 ENCOUNTER FOR SCREENING COLONOSCOPY: ICD-10-CM

## 2023-09-13 DIAGNOSIS — G47.09 OTHER INSOMNIA: ICD-10-CM

## 2023-09-13 DIAGNOSIS — K58.1 IRRITABLE BOWEL SYNDROME WITH CONSTIPATION: ICD-10-CM

## 2023-09-13 PROCEDURE — 99214 OFFICE O/P EST MOD 30 MIN: CPT | Performed by: NURSE PRACTITIONER

## 2023-09-13 RX ORDER — OMEPRAZOLE 40 MG/1
TAKE 1 CAPSULE CAPSULE, DELAYED RELEASE ORAL
OUTPATIENT
Start: 2023-07-19

## 2023-09-13 RX ORDER — LACTULOSE 10 G/15ML
TAKE 15 ML SOLUTION ORAL
Status: DISCONTINUED | COMMUNITY

## 2023-09-13 RX ORDER — TRAZODONE HYDROCHLORIDE 50 MG/1
1 TABLET AT BEDTIME AS NEEDED TABLET ORAL
Qty: 90 | Refills: 1 | OUTPATIENT

## 2023-09-13 RX ORDER — AMLODIPINE BESYLATE 5 MG/1
1 TABLET TABLET ORAL ONCE A DAY
Status: ACTIVE | COMMUNITY

## 2023-09-13 RX ORDER — ALUMINUM HYDROXIDE AND MAGNESIUM CARBONATE 95; 358 MG/15ML; MG/15ML
15 ML AFTER MEALS AND AT BEDTIME AS NEEDED LIQUID ORAL
OUTPATIENT
Start: 2023-09-13

## 2023-09-13 RX ORDER — OMEPRAZOLE 40 MG/1
TAKE 1 CAPSULE CAPSULE, DELAYED RELEASE ORAL
Qty: 180 | Refills: 1 | Status: ACTIVE | COMMUNITY
Start: 2023-07-19

## 2023-09-13 RX ORDER — TRAZODONE HYDROCHLORIDE 50 MG/1
TAKE 1/2 TABLET AT BEDTIME AS NEEDED ORALLY ONCE A DAY FOR SLEEP 30 DAYS TABLET ORAL
Qty: 15 TABLET | Refills: 2 | Status: ACTIVE | COMMUNITY

## 2023-09-13 RX ORDER — LACTULOSE 10 G/15ML
TAKE 15 ML SOLUTION ORAL
Qty: 900 ML | Refills: 5 | Status: ACTIVE | COMMUNITY

## 2023-09-13 RX ORDER — LEVOTHYROXINE SODIUM 50 UG/1
1 TABLET IN THE MORNING ON AN EMPTY STOMACH TABLET ORAL ONCE A DAY
Status: ACTIVE | COMMUNITY

## 2023-09-13 RX ORDER — SUCRALFATE 1 G/1
TAKE 1 TABLET ON AN EMPTY STOMACH ORALLY 3 TIMES A DAY BEFORE MEALS 30 DAYS TABLET ORAL
Qty: 90 TABLET | Refills: 1 | Status: ACTIVE | COMMUNITY

## 2023-09-13 RX ORDER — LACTULOSE 10 G/15ML
TAKE 15 ML SOLUTION ORAL
OUTPATIENT

## 2023-09-13 RX ORDER — ATENOLOL 25 MG/1
1 TABLET TABLET ORAL ONCE A DAY
Status: ACTIVE | COMMUNITY

## 2023-09-13 NOTE — HPI-TODAY'S VISIT:
Patient presents today for follow up.  Continues to voice post-prandial burning in retrosternal region of mid-chest.  Pain lasts for hours at a time and feels it is a constant complaint.  Denies experiencing shortness of breath, pain radiating to other areas or nausea/vomiting.  HIDA with EF on 2023 at 42.9%. Remains on BID dosing of ant-acid medication without relief.  Taking Carafate led to increased abdominal discomfort.  EGD on 2023:  Reflux esophagitis (neg Montoya's), non-bleeding erosive gastritis (neg HP) and normal duodenum.  Patient is scheduled for PET scan scheduled for next month by her oncologist.     Referred to cardiology in May 2023 to rule out underlying heart disease.  However, patient was unable to schedule visit. Remains on lactulose daily to manage constipation with adequate response voiced with use.  Denies prior colonoscopy procedure.   Taking 1/2 Trazodone tablet has helped to manage her insomnia.  Denies daytime somnolence with use.  Patient has hx of throat cancer, grandson has hx of leukemia and sister  earlier this year from cancer which has lead to increased levels of stress/anxiety.

## 2023-09-13 NOTE — HPI-OTHER HISTORIES
----------------------------------------------------------------------------------------------------------------- Last office note 2023: Presents as procedure follow up after undergoing EGD on 2023.  EGD repeated due to continued c/o mid-chest pain and reflux.  Findings:  reflux esophagitis (neg Montoya's), non-bleeding erosive gastritis (neg HP) and normal duodenum.   Remains on pantoprazole 40 mg BID and Carafate TID but continues to voice burning epigastric abdominal pain.  Patient describes her appetite as being good and desires to eat.  However, eating leads to increase epigastric pain.  Denies nausea, vomiting or symptoms of chest pain are voiced at this time.  Has not been evaluated by cardiology as recommended at [facility].  Describes her anxiety/stress level as being manageable.  However, she states she only sleeps about 2-4 hours.  Taking melatonin does not provide long-lasting relief.    RUQ US 2023: suspected gallstone w/o signs of cholecystitis.  HIDA scan was ordered at LOV but has not been completed as of this visit.   Patient is requesting antireflux medication.    Remains on lactulose daily and voices a complete sense of evacuation with daily use.  Of note, patient has hx of throat cancer, grandson has hx of leukemia and sister  earlier this year from cancer.

## 2023-09-15 ENCOUNTER — TELEPHONE ENCOUNTER (OUTPATIENT)
Dept: URBAN - METROPOLITAN AREA CLINIC 88 | Facility: CLINIC | Age: 63
End: 2023-09-15

## 2023-09-15 RX ORDER — LACTULOSE 10 G/15ML
TAKE 15 ML SOLUTION ORAL
Qty: 900 ML | Refills: 11

## 2023-09-21 ENCOUNTER — TELEPHONE ENCOUNTER (OUTPATIENT)
Dept: URBAN - METROPOLITAN AREA CLINIC 6 | Facility: CLINIC | Age: 63
End: 2023-09-21

## 2023-10-09 ENCOUNTER — TELEPHONE ENCOUNTER (OUTPATIENT)
Dept: URBAN - METROPOLITAN AREA CLINIC 70 | Facility: CLINIC | Age: 63
End: 2023-10-09

## 2023-10-09 PROBLEM — 3696007: Status: ACTIVE | Noted: 2023-10-09

## 2023-10-09 RX ORDER — METRONIDAZOLE 500 MG/1
1 TABLET TABLET ORAL THREE TIMES A DAY
Qty: 21 TABLET | Refills: 0 | OUTPATIENT
Start: 2023-10-10 | End: 2023-10-17

## 2023-10-09 RX ORDER — NORTRIPTYLINE HYDROCHLORIDE 10 MG/1
1 CAPSULE CAPSULE ORAL
Qty: 30 | Refills: 1 | OUTPATIENT
Start: 2023-10-10

## 2023-10-09 RX ORDER — CIPROFLOXACIN HYDROCHLORIDE 500 MG/1
1 TABLET TABLET, FILM COATED ORAL
Qty: 14 TABLET | Refills: 0 | OUTPATIENT
Start: 2023-10-10 | End: 2023-10-17

## 2023-10-09 RX ORDER — TRAZODONE HYDROCHLORIDE 50 MG/1
TAKE 1/2 TABLET AT BEDTIME AS NEEDED ORALLY ONCE A DAY FOR SLEEP 30 DAYS TABLET ORAL
OUTPATIENT

## 2023-10-10 ENCOUNTER — TELEPHONE ENCOUNTER (OUTPATIENT)
Dept: URBAN - METROPOLITAN AREA CLINIC 88 | Facility: CLINIC | Age: 63
End: 2023-10-10

## 2023-11-08 ENCOUNTER — OFFICE VISIT (OUTPATIENT)
Dept: URBAN - METROPOLITAN AREA CLINIC 88 | Facility: CLINIC | Age: 63
End: 2023-11-08
Payer: MEDICARE

## 2023-11-08 VITALS
HEIGHT: 63 IN | SYSTOLIC BLOOD PRESSURE: 122 MMHG | BODY MASS INDEX: 16.73 KG/M2 | WEIGHT: 94.4 LBS | TEMPERATURE: 97.6 F | DIASTOLIC BLOOD PRESSURE: 80 MMHG | HEART RATE: 86 BPM | OXYGEN SATURATION: 100 %

## 2023-11-08 DIAGNOSIS — K21.00 ALKALINE REFLUX ESOPHAGITIS: ICD-10-CM

## 2023-11-08 DIAGNOSIS — R10.9 FUNCTIONAL ABDOMINAL PAIN SYNDROME: ICD-10-CM

## 2023-11-08 DIAGNOSIS — K58.1 IRRITABLE BOWEL SYNDROME WITH CONSTIPATION: ICD-10-CM

## 2023-11-08 DIAGNOSIS — K80.20 CALCULUS OF GALLBLADDER WITHOUT CHOLECYSTITIS WITHOUT OBSTRUCTION: ICD-10-CM

## 2023-11-08 PROCEDURE — 99214 OFFICE O/P EST MOD 30 MIN: CPT | Performed by: NURSE PRACTITIONER

## 2023-11-08 RX ORDER — LACTULOSE 10 G/15ML
TAKE 15 ML SOLUTION ORAL
Qty: 900 ML | Refills: 11 | Status: ACTIVE | COMMUNITY

## 2023-11-08 RX ORDER — OMEPRAZOLE 40 MG/1
TAKE 1 CAPSULE CAPSULE, DELAYED RELEASE ORAL
OUTPATIENT

## 2023-11-08 RX ORDER — NORTRIPTYLINE HYDROCHLORIDE 10 MG/1
1 CAPSULE CAPSULE ORAL
OUTPATIENT
Start: 2023-10-10

## 2023-11-08 RX ORDER — TRAZODONE HYDROCHLORIDE 50 MG/1
1 TABLET AT BEDTIME AS NEEDED TABLET ORAL
Qty: 90 | Refills: 1 | Status: DISCONTINUED | COMMUNITY

## 2023-11-08 RX ORDER — AMLODIPINE BESYLATE 5 MG/1
1 TABLET TABLET ORAL ONCE A DAY
Status: ACTIVE | COMMUNITY

## 2023-11-08 RX ORDER — ALUMINUM HYDROXIDE AND MAGNESIUM CARBONATE 95; 358 MG/15ML; MG/15ML
15 ML AFTER MEALS AND AT BEDTIME AS NEEDED LIQUID ORAL
Status: ACTIVE | COMMUNITY
Start: 2023-09-13

## 2023-11-08 RX ORDER — LEVOTHYROXINE SODIUM 50 UG/1
1 TABLET IN THE MORNING ON AN EMPTY STOMACH TABLET ORAL ONCE A DAY
Status: ACTIVE | COMMUNITY

## 2023-11-08 RX ORDER — LACTULOSE 10 G/15ML
TAKE 15 ML SOLUTION ORAL
OUTPATIENT

## 2023-11-08 RX ORDER — NORTRIPTYLINE HYDROCHLORIDE 10 MG/1
1 CAPSULE CAPSULE ORAL
Qty: 30 | Refills: 1 | Status: ACTIVE | COMMUNITY
Start: 2023-10-10

## 2023-11-08 RX ORDER — LACTULOSE 10 G/15ML
TAKE 15 ML SOLUTION ORAL
Qty: 900 ML | Refills: 5 | Status: ACTIVE | COMMUNITY

## 2023-11-08 RX ORDER — OMEPRAZOLE 40 MG/1
TAKE 1 CAPSULE CAPSULE, DELAYED RELEASE ORAL
Status: ACTIVE | COMMUNITY
Start: 2023-07-19

## 2023-11-08 RX ORDER — SUCRALFATE 1 G/1
TAKE 1 TABLET ON AN EMPTY STOMACH ORALLY 3 TIMES A DAY BEFORE MEALS 30 DAYS TABLET ORAL
Qty: 90 TABLET | Refills: 1 | Status: ACTIVE | COMMUNITY

## 2023-11-08 RX ORDER — ATENOLOL 25 MG/1
1 TABLET TABLET ORAL ONCE A DAY
Status: ACTIVE | COMMUNITY

## 2023-11-08 NOTE — HPI-OTHER HISTORIES
------------------------------------------------------------------ Last office note 2023: Patient presents today for follow up. Continues to voice post-prandial burning in retrosternal region of mid-chest. Pain lasts for hours at a time and feels it is a constant complaint. Denies experiencing shortness of breath, pain radiating to other areas or nausea/vomiting. HIDA with EF on 2023 at 57%. Remains on BID dosing of ant-acid medication without relief. Taking Carafate led to increased abdominal discomfort. EGD on 2023: Reflux esophagitis (neg Montoya's), non-bleeding erosive gastritis (neg HP) and normal duodenum. Patient is scheduled for PET scan scheduled for next month by her oncologist. Referred to cardiology in May 2023 to rule out underlying heart disease. However, patient was unable to schedule visit. Remains on lactulose daily to manage constipation with adequate response voiced with use. Denies prior colonoscopy procedure. Taking 1/2 Trazodone tablet has helped to manage her insomnia. Denies daytime somnolence with use.  Patient has hx of throat cancer, grandson has hx of leukemia and sister  earlier this year from cancer which has lead to increased levels of stress/anxiety.

## 2023-11-08 NOTE — HPI-TODAY'S VISIT:
Presents for follow up regarding abdominal pain.  CT A/P on 09/28/2023:  left sided diverticulosis w/o diverticulitis, mild colitis in descending and transverse colon.  She was prescribed Cipro and Flagyl treatment of colitis.   Also switched from trazodone to nortriptyline at bedtime.  Unsure as to which regimen has been effective but has started to noticed improvement in post-prandial abd pain.  States pain is still present but not as intense.  Feels she is able to eat slightly more than before.  Denies significant morning insomnia due to low dose nortriptyline.  Sleeps about 3-4 hours at night.   HIDA with EF on 06/28/2023 at 57%. EGD on 06/14/2023: Reflux esophagitis (neg Montoya's), non-bleeding erosive gastritis (neg HP) and normal duodenum.

## 2023-11-08 NOTE — PHYSICAL EXAM GASTROINTESTINAL
Abdomen , soft, generalized tenderness, nondistended , no guarding or rigidity , no masses palpable , normal bowel sounds , Liver and Spleen:  no hepatosplenomegaly

## 2023-11-29 ENCOUNTER — TELEPHONE ENCOUNTER (OUTPATIENT)
Dept: URBAN - METROPOLITAN AREA CLINIC 70 | Facility: CLINIC | Age: 63
End: 2023-11-29

## 2023-11-29 RX ORDER — NORTRIPTYLINE HYDROCHLORIDE 10 MG/1
TAKE 2 CAPSULES CAPSULE ORAL
Qty: 180 | Refills: 0
Start: 2023-10-10

## 2024-01-03 ENCOUNTER — ERX REFILL RESPONSE (OUTPATIENT)
Dept: URBAN - METROPOLITAN AREA CLINIC 70 | Facility: CLINIC | Age: 64
End: 2024-01-03

## 2024-01-03 RX ORDER — OMEPRAZOLE 40 MG/1
TAKE 1 CAPSULE CAPSULE, DELAYED RELEASE ORAL
OUTPATIENT

## 2024-01-03 RX ORDER — OMEPRAZOLE 40 MG/1
TAKE 1 CAPSULE CAPSULE, DELAYED RELEASE ORAL
Qty: 180 | Refills: 0 | OUTPATIENT

## 2024-02-01 NOTE — HPI-TODAY'S VISIT:
Presents today with continued c/o burning mid-chest pain that occurs on daily basis, especially post-prandially.  Feels burning sensation is worse after eating spicy, seasoned and acidic foods.  As result, has been "living off of" toast, crackers and milk.  Pain has persisted despite taking high dose PPI therapy for several months.  Addition of carafate provided little relief in complaints.  As result, has started taking Mylanta or Maalox as needed.  Denies signs of GI blood loss or weight loss.  Pain does not radiate to other regions.  Feels pain has remained at same intensity since initial consult.  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  RUQ US 01/17/2023:  Normal. Remains on lactulose once a day with adequate BMs voiced.  Experiences a complete sense of evacuation with every BM.   Denies recent cardiac evaluation.   Daughter states patient to receive results of PET scan from oncologist tomorrow. 20

## 2024-02-07 ENCOUNTER — LAB (OUTPATIENT)
Dept: URBAN - METROPOLITAN AREA CLINIC 88 | Facility: CLINIC | Age: 64
End: 2024-02-07

## 2024-02-07 ENCOUNTER — OV EP (OUTPATIENT)
Dept: URBAN - METROPOLITAN AREA CLINIC 88 | Facility: CLINIC | Age: 64
End: 2024-02-07
Payer: MEDICARE

## 2024-02-07 VITALS
OXYGEN SATURATION: 96 % | TEMPERATURE: 97.6 F | BODY MASS INDEX: 16.41 KG/M2 | HEIGHT: 63 IN | DIASTOLIC BLOOD PRESSURE: 83 MMHG | WEIGHT: 92.6 LBS | SYSTOLIC BLOOD PRESSURE: 138 MMHG | HEART RATE: 93 BPM

## 2024-02-07 DIAGNOSIS — Z86.59 HISTORY OF ANXIETY: ICD-10-CM

## 2024-02-07 DIAGNOSIS — R68.81 EARLY SATIETY: ICD-10-CM

## 2024-02-07 DIAGNOSIS — R10.9 FUNCTIONAL ABDOMINAL PAIN SYNDROME: ICD-10-CM

## 2024-02-07 DIAGNOSIS — R11.0 NAUSEA: ICD-10-CM

## 2024-02-07 DIAGNOSIS — R10.13 EPIGASTRIC ABDOMINAL PAIN: ICD-10-CM

## 2024-02-07 DIAGNOSIS — K58.1 IRRITABLE BOWEL SYNDROME WITH CONSTIPATION: ICD-10-CM

## 2024-02-07 DIAGNOSIS — Z12.11 ENCOUNTER FOR SCREENING COLONOSCOPY: ICD-10-CM

## 2024-02-07 DIAGNOSIS — K80.20 CALCULUS OF GALLBLADDER WITHOUT CHOLECYSTITIS WITHOUT OBSTRUCTION: ICD-10-CM

## 2024-02-07 DIAGNOSIS — K21.00 GASTROESOPHAGEAL REFLUX DISEASE WITH ESOPHAGITIS WITHOUT HEMORRHAGE: ICD-10-CM

## 2024-02-07 PROBLEM — 161470009: Status: ACTIVE | Noted: 2024-02-07

## 2024-02-07 PROCEDURE — 99215 OFFICE O/P EST HI 40 MIN: CPT | Performed by: NURSE PRACTITIONER

## 2024-02-07 RX ORDER — ALUMINUM HYDROXIDE AND MAGNESIUM CARBONATE 95; 358 MG/15ML; MG/15ML
15 ML AFTER MEALS AND AT BEDTIME AS NEEDED LIQUID ORAL
Status: ACTIVE | COMMUNITY
Start: 2023-09-13

## 2024-02-07 RX ORDER — ATENOLOL 25 MG/1
1 TABLET TABLET ORAL ONCE A DAY
Status: ACTIVE | COMMUNITY

## 2024-02-07 RX ORDER — OMEPRAZOLE 40 MG/1
TAKE 1 CAPSULE CAPSULE, DELAYED RELEASE ORAL
Qty: 180 | Refills: 0

## 2024-02-07 RX ORDER — AMLODIPINE BESYLATE 5 MG/1
1 TABLET TABLET ORAL ONCE A DAY
Status: ACTIVE | COMMUNITY

## 2024-02-07 RX ORDER — OMEPRAZOLE 40 MG/1
TAKE 1 CAPSULE CAPSULE, DELAYED RELEASE ORAL
Qty: 180 | Refills: 0 | Status: ACTIVE | COMMUNITY

## 2024-02-07 RX ORDER — SUCRALFATE 1 G/1
TAKE 1 TABLET ON AN EMPTY STOMACH ORALLY 3 TIMES A DAY BEFORE MEALS 30 DAYS TABLET ORAL
Qty: 90 TABLET | Refills: 1 | Status: ACTIVE | COMMUNITY

## 2024-02-07 RX ORDER — LEVOTHYROXINE SODIUM 50 UG/1
1 TABLET IN THE MORNING ON AN EMPTY STOMACH TABLET ORAL ONCE A DAY
Status: ACTIVE | COMMUNITY

## 2024-02-07 RX ORDER — NORTRIPTYLINE HYDROCHLORIDE 10 MG/1
TAKE 2 CAPSULES CAPSULE ORAL
Qty: 180 | Refills: 1
Start: 2023-10-10

## 2024-02-07 RX ORDER — LACTULOSE 10 G/15ML
TAKE 30 ML SOLUTION ORAL
Qty: 1800 ML | Refills: 11

## 2024-02-07 RX ORDER — LACTULOSE 10 G/15ML
TAKE 15 ML SOLUTION ORAL
Qty: 900 ML | Refills: 5 | Status: ACTIVE | COMMUNITY

## 2024-02-07 RX ORDER — NORTRIPTYLINE HYDROCHLORIDE 10 MG/1
TAKE 2 CAPSULES CAPSULE ORAL
Qty: 180 | Refills: 0 | Status: ACTIVE | COMMUNITY
Start: 2023-10-10

## 2024-02-07 RX ORDER — LACTULOSE 10 G/15ML
TAKE 15 ML SOLUTION ORAL
Status: ACTIVE | COMMUNITY

## 2024-02-07 NOTE — PHYSICAL EXAM GASTROINTESTINAL
Abdomen , soft, epigastric tenderness with mild palpation, nondistended , no guarding or rigidity , no masses palpable , normal bowel sounds , Liver and Spleen: no hepatosplenomegaly

## 2024-02-07 NOTE — HPI-TODAY'S VISIT:
Presents today for follow up regarding functional abdominal pain, constipation.  She remains on lactulose daily to manage constipation.  Taking medication twice a day to induce defecation.  Despite daily use, still fails to experience a complete sense of evacuation.  With complete evacuation, feels her abdominal pain improves significantly when complete evacuation is achieved.  Has noticed increase in c/o daily nausea, early satiety and epigastric fullness.  HIDA with EF on 06/28/2023 at 57%. EGD on 06/14/2023: Reflux esophagitis (neg Montoya's), non-bleeding erosive gastritis (neg HP) and normal duodenum.  States she is ready to proceed with scheduling colonoscopy.  She has declined in the past.  Denies prior colonoscopy or family hx of colon cancer.   Voices onset of headaches over the last month.  Has questions if this is related to use of nortriptyline.    Admits to increase stress in worrying about her grandson, who has leukemia.

## 2024-02-07 NOTE — HPI-OTHER HISTORIES
----------------------------------------------------------------- Last office note 11/08/2023: Presents for follow up regarding abdominal pain. CT A/P on 09/28/2023: left sided diverticulosis w/o diverticulitis, mild colitis in descending and transverse colon. She was prescribed Cipro and Flagyl treatment of colitis. Also switched from trazodone to nortriptyline at bedtime. Unsure as to which regimen has been effective but has started to noticed improvement in post-prandial abd pain. States pain is still present but not as intense. Feels she is able to eat slightly more than before. Denies significant morning insomnia due to low dose nortriptyline. Sleeps about 3-4 hours at night. HIDA with EF on 06/28/2023 at 57%. EGD on 06/14/2023: Reflux esophagitis (neg Montoya's), non-bleeding erosive gastritis (neg HP) and normal duodenum.

## 2024-03-20 ENCOUNTER — OV EP (OUTPATIENT)
Dept: URBAN - METROPOLITAN AREA CLINIC 88 | Facility: CLINIC | Age: 64
End: 2024-03-20
Payer: MEDICARE

## 2024-03-20 VITALS
HEART RATE: 98 BPM | WEIGHT: 93.8 LBS | OXYGEN SATURATION: 97 % | BODY MASS INDEX: 16.62 KG/M2 | SYSTOLIC BLOOD PRESSURE: 104 MMHG | TEMPERATURE: 97.8 F | HEIGHT: 63 IN | DIASTOLIC BLOOD PRESSURE: 68 MMHG

## 2024-03-20 DIAGNOSIS — K58.1 IRRITABLE BOWEL SYNDROME WITH CONSTIPATION: ICD-10-CM

## 2024-03-20 DIAGNOSIS — R68.81 EARLY SATIETY: ICD-10-CM

## 2024-03-20 DIAGNOSIS — K80.20 CALCULUS OF GALLBLADDER WITHOUT CHOLECYSTITIS WITHOUT OBSTRUCTION: ICD-10-CM

## 2024-03-20 DIAGNOSIS — R10.9 FUNCTIONAL ABDOMINAL PAIN SYNDROME: ICD-10-CM

## 2024-03-20 DIAGNOSIS — R11.0 NAUSEA: ICD-10-CM

## 2024-03-20 DIAGNOSIS — Z86.59 HISTORY OF ANXIETY: ICD-10-CM

## 2024-03-20 DIAGNOSIS — Z12.11 ENCOUNTER FOR SCREENING COLONOSCOPY: ICD-10-CM

## 2024-03-20 DIAGNOSIS — R10.13 EPIGASTRIC ABDOMINAL PAIN: ICD-10-CM

## 2024-03-20 DIAGNOSIS — K21.00 GASTROESOPHAGEAL REFLUX DISEASE WITH ESOPHAGITIS WITHOUT HEMORRHAGE: ICD-10-CM

## 2024-03-20 PROCEDURE — 99214 OFFICE O/P EST MOD 30 MIN: CPT | Performed by: NURSE PRACTITIONER

## 2024-03-20 RX ORDER — ATENOLOL 25 MG/1
1 TABLET TABLET ORAL ONCE A DAY
Status: DISCONTINUED | COMMUNITY

## 2024-03-20 RX ORDER — SUCRALFATE 1 G/1
TAKE 1 TABLET ON AN EMPTY STOMACH ORALLY 3 TIMES A DAY BEFORE MEALS 30 DAYS TABLET ORAL
Qty: 90 TABLET | Refills: 1 | Status: DISCONTINUED | COMMUNITY

## 2024-03-20 RX ORDER — OMEPRAZOLE 40 MG/1
TAKE 1 CAPSULE CAPSULE, DELAYED RELEASE ORAL
Qty: 180 | Refills: 0

## 2024-03-20 RX ORDER — LACTULOSE 10 G/15ML
TAKE 15 ML SOLUTION ORAL
Qty: 900 ML | Refills: 5 | Status: DISCONTINUED | COMMUNITY

## 2024-03-20 RX ORDER — NORTRIPTYLINE HYDROCHLORIDE 10 MG/1
TAKE 2 CAPSULES CAPSULE ORAL
Qty: 180 | Refills: 1

## 2024-03-20 RX ORDER — LACTULOSE 10 G/15ML
TAKE 30 ML SOLUTION ORAL
Qty: 1800 ML | Refills: 11

## 2024-03-20 RX ORDER — ALUMINUM HYDROXIDE AND MAGNESIUM CARBONATE 95; 358 MG/15ML; MG/15ML
15 ML AFTER MEALS AND AT BEDTIME AS NEEDED LIQUID ORAL
Status: ACTIVE | COMMUNITY
Start: 2023-09-13

## 2024-03-20 RX ORDER — OMEPRAZOLE 40 MG/1
TAKE 1 CAPSULE CAPSULE, DELAYED RELEASE ORAL
Qty: 180 | Refills: 1 | Status: ACTIVE | COMMUNITY

## 2024-03-20 RX ORDER — LACTULOSE 10 G/15ML
TAKE 30 ML SOLUTION ORAL
Qty: 1800 ML | Refills: 11 | Status: ACTIVE | COMMUNITY

## 2024-03-20 RX ORDER — LEVOTHYROXINE SODIUM 50 UG/1
1 TABLET IN THE MORNING ON AN EMPTY STOMACH TABLET ORAL ONCE A DAY
Status: ACTIVE | COMMUNITY

## 2024-03-20 RX ORDER — NORTRIPTYLINE HYDROCHLORIDE 10 MG/1
TAKE 2 CAPSULES CAPSULE ORAL
Qty: 180 | Refills: 1 | Status: ACTIVE | COMMUNITY
Start: 2023-10-10

## 2024-03-20 RX ORDER — AMLODIPINE BESYLATE 5 MG/1
1 TABLET TABLET ORAL ONCE A DAY
Status: ACTIVE | COMMUNITY

## 2024-03-20 NOTE — HPI-TODAY'S VISIT:
Presents today for follow up.  At this time, voicing increase in constipation with stools becoming firm stools.  States this started over the last week despite taking lactulose twice a day.  May not achieve a complete sense of evacuation of stool.  Experiences increase abdominal bloating and belching with consitpation.  Still voices increase abdominal pain that she describes as constant soreness, especially to upper abdomen. Provided samples of Trulance at LOV but is unsure of results of samples.  Has tried and failed stool softeners, magnesium tablets, fiber regimen and Miralax in past for constipation.  She has not completed gastric emptying as ordered.   States she is ready to proceed with scheduling colonoscopy. She has declined in the past. Denies prior colonoscopy or family hx of colon cancer. Last CT A/P in October 2023 revealed mild colitis in descending and transverse colon.    Admits to increase stress in worrying about her grandson, who has leukemia.

## 2024-03-20 NOTE — HPI-OTHER HISTORIES
----------------------------------------------------------------------------------- Last office note 02/07/2024: Presents today for follow up regarding functional abdominal pain, constipation. She remains on lactulose daily to manage constipation. Taking medication twice a day to induce defecation. Despite daily use, still fails to experience a complete sense of evacuation. With complete evacuation, feels her abdominal pain improves significantly when complete evacuation is achieved. Has noticed increase in c/o daily nausea, early satiety and epigastric fullness. HIDA with EF on 06/28/2023 at 57%. EGD on 06/14/2023: Reflux esophagitis (neg Montoya's), non-bleeding erosive gastritis (neg HP) and normal duodenum.  States she is ready to proceed with scheduling colonoscopy. She has declined in the past. Denies prior colonoscopy or family hx of colon cancer. Voices onset of headaches over the last month. Has questions if this is related to use of nortriptyline.  Admits to increase stress in worrying about her grandson, who has leukemia.

## 2024-04-18 ENCOUNTER — COLON (OUTPATIENT)
Dept: URBAN - METROPOLITAN AREA MEDICAL CENTER 42 | Facility: MEDICAL CENTER | Age: 64
End: 2024-04-18
Payer: COMMERCIAL

## 2024-04-18 DIAGNOSIS — R93.3 ABN FINDINGS-GI TRACT: ICD-10-CM

## 2024-04-18 DIAGNOSIS — K59.09 CHANGE IN BOWEL MOVEMENTS INTERMITTENT CONSTIPATION. URGENCY IN THE MORNING.: ICD-10-CM

## 2024-04-18 PROCEDURE — 45378 DIAGNOSTIC COLONOSCOPY: CPT | Performed by: INTERNAL MEDICINE

## 2024-09-02 ENCOUNTER — ERX REFILL RESPONSE (OUTPATIENT)
Dept: URBAN - METROPOLITAN AREA CLINIC 70 | Facility: CLINIC | Age: 64
End: 2024-09-02

## 2024-09-02 RX ORDER — NORTRIPTYLINE HYDROCHLORIDE 10 MG/1
TAKE 2 CAPSULES CAPSULE ORAL
Qty: 180 | Refills: 1 | OUTPATIENT

## 2024-09-02 RX ORDER — NORTRIPTYLINE HYDROCHLORIDE 10 MG/1
TAKE 2 CAPSULES ORALLY ONCE A DAY AT BEDTIME 90 DAYS CAPSULE ORAL
Qty: 180 CAPSULE | Refills: 1 | OUTPATIENT

## 2024-09-11 ENCOUNTER — ERX REFILL RESPONSE (OUTPATIENT)
Dept: URBAN - METROPOLITAN AREA CLINIC 70 | Facility: CLINIC | Age: 64
End: 2024-09-11

## 2024-09-11 RX ORDER — OMEPRAZOLE 40 MG/1
TAKE 1 CAPSULE ORALLY TWICE A DAY FOR REFLUX 90 DAYS CAPSULE, DELAYED RELEASE ORAL
Qty: 180 CAPSULE | Refills: 1 | OUTPATIENT

## 2024-09-11 RX ORDER — OMEPRAZOLE 40 MG/1
TAKE 1 CAPSULE CAPSULE, DELAYED RELEASE ORAL
Qty: 180 | Refills: 0 | OUTPATIENT

## 2024-12-09 ENCOUNTER — ERX REFILL RESPONSE (OUTPATIENT)
Dept: URBAN - METROPOLITAN AREA CLINIC 70 | Facility: CLINIC | Age: 64
End: 2024-12-09

## 2024-12-09 RX ORDER — LACTULOSE 10 G/15ML
TAKE 30 ML SOLUTION ORAL
Qty: 1800 ML | Refills: 3 | OUTPATIENT

## 2024-12-09 RX ORDER — LACTULOSE 10 G/15ML
TAKE 30 ML SOLUTION ORAL
Qty: 1800 ML | Refills: 11 | OUTPATIENT

## 2025-01-23 ENCOUNTER — ERX REFILL RESPONSE (OUTPATIENT)
Dept: URBAN - METROPOLITAN AREA CLINIC 70 | Facility: CLINIC | Age: 65
End: 2025-01-23

## 2025-01-23 RX ORDER — NORTRIPTYLINE HYDROCHLORIDE 10 MG/1
TAKE 2 CAPSULES ORALLY ONCE A DAY AT BEDTIME 90 DAYS CAPSULE ORAL
Qty: 180 CAPSULE | Refills: 1 | OUTPATIENT

## 2025-01-23 RX ORDER — NORTRIPTYLINE HYDROCHLORIDE 10 MG/1
TAKE 2 CAPSULES ORALLY ONCE A DAY AT BEDTIME 90 DAYS CAPSULE ORAL
Qty: 180 CAPSULE | Refills: 2 | OUTPATIENT